# Patient Record
Sex: MALE | NOT HISPANIC OR LATINO | URBAN - METROPOLITAN AREA
[De-identification: names, ages, dates, MRNs, and addresses within clinical notes are randomized per-mention and may not be internally consistent; named-entity substitution may affect disease eponyms.]

---

## 2024-07-15 ENCOUNTER — APPOINTMENT (RX ONLY)
Dept: URBAN - METROPOLITAN AREA CLINIC 109 | Facility: CLINIC | Age: 59
Setting detail: DERMATOLOGY
End: 2024-07-15

## 2024-07-15 VITALS — WEIGHT: 220 LBS | HEIGHT: 68 IN

## 2024-07-15 DIAGNOSIS — L82.1 OTHER SEBORRHEIC KERATOSIS: ICD-10-CM | Status: STABLE

## 2024-07-15 DIAGNOSIS — D22 MELANOCYTIC NEVI: ICD-10-CM | Status: STABLE

## 2024-07-15 DIAGNOSIS — L738 OTHER SPECIFIED DISEASES OF HAIR AND HAIR FOLLICLES: ICD-10-CM

## 2024-07-15 DIAGNOSIS — L81.4 OTHER MELANIN HYPERPIGMENTATION: ICD-10-CM | Status: STABLE

## 2024-07-15 DIAGNOSIS — L663 OTHER SPECIFIED DISEASES OF HAIR AND HAIR FOLLICLES: ICD-10-CM

## 2024-07-15 DIAGNOSIS — D18.0 HEMANGIOMA: ICD-10-CM | Status: STABLE

## 2024-07-15 DIAGNOSIS — L85.3 XEROSIS CUTIS: ICD-10-CM | Status: STABLE

## 2024-07-15 DIAGNOSIS — H61.03 CHONDRITIS OF EXTERNAL EAR: ICD-10-CM

## 2024-07-15 DIAGNOSIS — L73.9 FOLLICULAR DISORDER, UNSPECIFIED: ICD-10-CM

## 2024-07-15 DIAGNOSIS — L57.8 OTHER SKIN CHANGES DUE TO CHRONIC EXPOSURE TO NONIONIZING RADIATION: ICD-10-CM | Status: STABLE

## 2024-07-15 PROBLEM — D23.9 OTHER BENIGN NEOPLASM OF SKIN, UNSPECIFIED: Status: ACTIVE | Noted: 2024-07-15

## 2024-07-15 PROBLEM — D22.62 MELANOCYTIC NEVI OF LEFT UPPER LIMB, INCLUDING SHOULDER: Status: ACTIVE | Noted: 2024-07-15

## 2024-07-15 PROBLEM — L02.92 FURUNCLE, UNSPECIFIED: Status: ACTIVE | Noted: 2024-07-15

## 2024-07-15 PROBLEM — D18.01 HEMANGIOMA OF SKIN AND SUBCUTANEOUS TISSUE: Status: ACTIVE | Noted: 2024-07-15

## 2024-07-15 PROBLEM — L90.5 SCAR CONDITIONS AND FIBROSIS OF SKIN: Status: ACTIVE | Noted: 2024-07-15

## 2024-07-15 PROBLEM — D22.61 MELANOCYTIC NEVI OF RIGHT UPPER LIMB, INCLUDING SHOULDER: Status: ACTIVE | Noted: 2024-07-15

## 2024-07-15 PROBLEM — D22.71 MELANOCYTIC NEVI OF RIGHT LOWER LIMB, INCLUDING HIP: Status: ACTIVE | Noted: 2024-07-15

## 2024-07-15 PROBLEM — L57.0 ACTINIC KERATOSIS: Status: ACTIVE | Noted: 2024-07-15

## 2024-07-15 PROBLEM — D22.5 MELANOCYTIC NEVI OF TRUNK: Status: ACTIVE | Noted: 2024-07-15

## 2024-07-15 PROBLEM — H61.031 CHONDRITIS OF RIGHT EXTERNAL EAR: Status: ACTIVE | Noted: 2024-07-15

## 2024-07-15 PROBLEM — D22.72 MELANOCYTIC NEVI OF LEFT LOWER LIMB, INCLUDING HIP: Status: ACTIVE | Noted: 2024-07-15

## 2024-07-15 PROCEDURE — ? COUNSELING

## 2024-07-15 PROCEDURE — ? SUNSCREEN RECOMMENDATIONS

## 2024-07-15 PROCEDURE — 99204 OFFICE O/P NEW MOD 45 MIN: CPT | Mod: 25

## 2024-07-15 PROCEDURE — ? INTRALESIONAL KENALOG

## 2024-07-15 PROCEDURE — ? INVENTORY

## 2024-07-15 PROCEDURE — 11900 INJECT SKIN LESIONS </W 7: CPT

## 2024-07-15 PROCEDURE — ? PRESCRIPTION SAMPLES PROVIDED

## 2024-07-15 ASSESSMENT — PAIN INTENSITY VAS: HOW INTENSE IS YOUR PAIN 0 BEING NO PAIN, 10 BEING THE MOST SEVERE PAIN POSSIBLE?: NO PAIN

## 2024-07-15 ASSESSMENT — LOCATION DETAILED DESCRIPTION DERM
LOCATION DETAILED: RIGHT SUPERIOR HELIX
LOCATION DETAILED: LEFT DISTAL POSTERIOR UPPER ARM
LOCATION DETAILED: RIGHT ANTECUBITAL SKIN
LOCATION DETAILED: LEFT MEDIAL UPPER BACK
LOCATION DETAILED: UPPER STERNUM
LOCATION DETAILED: MIDDLE STERNUM
LOCATION DETAILED: LEFT ANTERIOR PROXIMAL THIGH
LOCATION DETAILED: LEFT ANTERIOR DISTAL THIGH
LOCATION DETAILED: RIGHT INFERIOR MEDIAL UPPER BACK
LOCATION DETAILED: SUPERIOR THORACIC SPINE
LOCATION DETAILED: PERIUMBILICAL SKIN
LOCATION DETAILED: RIGHT ANTERIOR DISTAL UPPER ARM
LOCATION DETAILED: RIGHT ANTERIOR PROXIMAL THIGH
LOCATION DETAILED: RIGHT PROXIMAL POSTERIOR UPPER ARM
LOCATION DETAILED: INFERIOR THORACIC SPINE
LOCATION DETAILED: LEFT DISTAL POSTERIOR THIGH
LOCATION DETAILED: LEFT ANTERIOR DISTAL UPPER ARM
LOCATION DETAILED: RIGHT DISTAL POSTERIOR THIGH
LOCATION DETAILED: RIGHT ANTERIOR DISTAL THIGH
LOCATION DETAILED: LEFT PROXIMAL POSTERIOR UPPER ARM
LOCATION DETAILED: RIGHT MEDIAL SUPERIOR CHEST
LOCATION DETAILED: LEFT ANTECUBITAL SKIN
LOCATION DETAILED: EPIGASTRIC SKIN
LOCATION DETAILED: RIGHT DISTAL POSTERIOR UPPER ARM

## 2024-07-15 ASSESSMENT — LOCATION SIMPLE DESCRIPTION DERM
LOCATION SIMPLE: LEFT UPPER BACK
LOCATION SIMPLE: LEFT THIGH
LOCATION SIMPLE: CHEST
LOCATION SIMPLE: RIGHT POSTERIOR THIGH
LOCATION SIMPLE: RIGHT THIGH
LOCATION SIMPLE: RIGHT UPPER ARM
LOCATION SIMPLE: UPPER BACK
LOCATION SIMPLE: LEFT POSTERIOR UPPER ARM
LOCATION SIMPLE: RIGHT EAR
LOCATION SIMPLE: LEFT UPPER ARM
LOCATION SIMPLE: ABDOMEN
LOCATION SIMPLE: RIGHT POSTERIOR UPPER ARM
LOCATION SIMPLE: LEFT POSTERIOR THIGH
LOCATION SIMPLE: RIGHT UPPER BACK

## 2024-07-15 ASSESSMENT — LOCATION ZONE DERM
LOCATION ZONE: TRUNK
LOCATION ZONE: LEG
LOCATION ZONE: ARM
LOCATION ZONE: EAR

## 2024-07-15 NOTE — PROCEDURE: INTRALESIONAL KENALOG
Include Z78.9 (Other Specified Conditions Influencing Health Status) As An Associated Diagnosis?: No
How Many Mls Were Removed From The 40 Mg/Ml (1ml) Vial When Preparing The Injectable Solution?: 0
Kenalog Type Of Vial: Multiple Dose
Show Inventory Tab: Hide
Concentration Of Kenalog Solution Injected (Mg/Ml): 5.0
Kenalog Preparation: Kenalog
Validate Note Data When Using Inventory: Yes
Detail Level: Detailed
Total Volume (Ccs): .5
Medical Necessity Clause: This procedure was medically necessary because the lesions that were treated were:
Consent: The risks of atrophy were reviewed with the patient.

## 2024-07-15 NOTE — PROCEDURE: REASSURANCE
Hide Additional Notes?: No
Additional Notes (Optional): Reassured benign
Detail Level: Detailed
Additional Note: Reassured benign in nature
Detail Level: Zone

## 2024-12-09 ENCOUNTER — APPOINTMENT (OUTPATIENT)
Dept: URBAN - METROPOLITAN AREA CLINIC 109 | Facility: CLINIC | Age: 59
Setting detail: DERMATOLOGY
End: 2024-12-09

## 2024-12-09 VITALS — WEIGHT: 225 LBS | HEIGHT: 69 IN

## 2024-12-09 DIAGNOSIS — L82.0 INFLAMED SEBORRHEIC KERATOSIS: ICD-10-CM

## 2024-12-09 PROCEDURE — ? BIOPSY BY SHAVE METHOD

## 2024-12-09 PROCEDURE — 11102 TANGNTL BX SKIN SINGLE LES: CPT

## 2024-12-09 ASSESSMENT — LOCATION DETAILED DESCRIPTION DERM: LOCATION DETAILED: LEFT SUPERIOR UPPER BACK

## 2024-12-09 ASSESSMENT — LOCATION ZONE DERM: LOCATION ZONE: TRUNK

## 2024-12-09 ASSESSMENT — LOCATION SIMPLE DESCRIPTION DERM: LOCATION SIMPLE: LEFT UPPER BACK

## 2025-05-14 ENCOUNTER — APPOINTMENT (OUTPATIENT)
Dept: URBAN - METROPOLITAN AREA CLINIC 109 | Facility: CLINIC | Age: 60
Setting detail: DERMATOLOGY
End: 2025-05-14

## 2025-05-14 DIAGNOSIS — B36.8 OTHER SPECIFIED SUPERFICIAL MYCOSES: ICD-10-CM | Status: INADEQUATELY CONTROLLED

## 2025-05-14 PROBLEM — D23.72 OTHER BENIGN NEOPLASM OF SKIN OF LEFT LOWER LIMB, INCLUDING HIP: Status: ACTIVE | Noted: 2025-05-14

## 2025-05-14 PROCEDURE — ? TREATMENT REGIMEN

## 2025-05-14 PROCEDURE — 99214 OFFICE O/P EST MOD 30 MIN: CPT

## 2025-05-14 PROCEDURE — ? COUNSELING

## 2025-05-14 PROCEDURE — ? PRESCRIPTION

## 2025-05-14 RX ORDER — TRIAMCINOLONE ACETONIDE 0.25 MG/G
CREAM TOPICAL
Qty: 454 | Refills: 0 | Status: ERX | COMMUNITY
Start: 2025-05-14

## 2025-05-14 RX ORDER — FLUCONAZOLE 200 MG/1
TABLET ORAL
Qty: 30 | Refills: 2 | Status: ERX | COMMUNITY
Start: 2025-05-14

## 2025-05-14 RX ADMIN — FLUCONAZOLE: 200 TABLET ORAL at 00:00

## 2025-05-14 RX ADMIN — TRIAMCINOLONE ACETONIDE: 0.25 CREAM TOPICAL at 00:00

## 2025-05-14 NOTE — PROCEDURE: REASSURANCE
Detail Level: Detailed
Additional Notes (Optional): treated in clinic with electrocautery at no cost per Dr Sandoval.
Hide Additional Notes?: No

## 2025-05-14 NOTE — PROCEDURE: TREATMENT REGIMEN
Initiate Treatment: Diflucan 200 mg tablet \\n\\ntriamcinolone acetonide 0.025 % topical cream
Detail Level: Zone

## 2025-06-02 ENCOUNTER — APPOINTMENT (OUTPATIENT)
Dept: URBAN - METROPOLITAN AREA CLINIC 109 | Facility: CLINIC | Age: 60
Setting detail: DERMATOLOGY
End: 2025-06-02

## 2025-06-02 VITALS — HEIGHT: 69 IN | WEIGHT: 315 LBS

## 2025-06-02 DIAGNOSIS — L82.1 OTHER SEBORRHEIC KERATOSIS: ICD-10-CM | Status: INADEQUATELY CONTROLLED

## 2025-06-02 DIAGNOSIS — B36.8 OTHER SPECIFIED SUPERFICIAL MYCOSES: ICD-10-CM | Status: RESOLVING

## 2025-06-02 PROCEDURE — ? TREATMENT REGIMEN

## 2025-06-02 PROCEDURE — ? ADDITIONAL NOTES

## 2025-06-02 PROCEDURE — ? COUNSELING

## 2025-06-02 ASSESSMENT — LOCATION SIMPLE DESCRIPTION DERM: LOCATION SIMPLE: CHEST

## 2025-06-02 ASSESSMENT — LOCATION DETAILED DESCRIPTION DERM: LOCATION DETAILED: RIGHT LATERAL SUPERIOR CHEST

## 2025-06-02 ASSESSMENT — LOCATION ZONE DERM: LOCATION ZONE: TRUNK

## 2025-06-02 NOTE — PROCEDURE: ADDITIONAL NOTES
Additional Notes: treated in clinic with cautery at no cost per Dr. Sandoval.
Render Risk Assessment In Note?: no
Detail Level: Simple

## 2025-06-02 NOTE — PROCEDURE: TREATMENT REGIMEN
Continue Regimen: triamcinolone acetonide 0.025 % topical cream
Discontinue Regimen: Diflucan 200 mg tablet
Detail Level: Zone